# Patient Record
Sex: FEMALE | Race: WHITE | NOT HISPANIC OR LATINO | Employment: OTHER | URBAN - METROPOLITAN AREA
[De-identification: names, ages, dates, MRNs, and addresses within clinical notes are randomized per-mention and may not be internally consistent; named-entity substitution may affect disease eponyms.]

---

## 2024-02-14 ENCOUNTER — APPOINTMENT (OUTPATIENT)
Dept: OBSTETRICS AND GYNECOLOGY | Facility: CLINIC | Age: 74
End: 2024-02-14
Payer: MEDICARE

## 2024-03-01 ENCOUNTER — HOSPITAL ENCOUNTER (OUTPATIENT)
Dept: RADIOLOGY | Facility: HOSPITAL | Age: 74
Discharge: HOME | End: 2024-03-01
Payer: MEDICARE

## 2024-03-01 DIAGNOSIS — Z12.31 ENCOUNTER FOR SCREENING MAMMOGRAM FOR MALIGNANT NEOPLASM OF BREAST: ICD-10-CM

## 2024-03-01 PROCEDURE — 77067 SCR MAMMO BI INCL CAD: CPT

## 2024-03-01 PROCEDURE — 77063 BREAST TOMOSYNTHESIS BI: CPT

## 2024-03-05 ENCOUNTER — APPOINTMENT (OUTPATIENT)
Dept: OBSTETRICS AND GYNECOLOGY | Facility: CLINIC | Age: 74
End: 2024-03-05
Payer: MEDICARE

## 2024-03-18 PROBLEM — N95.1 MENOPAUSAL SYMPTOMS: Status: ACTIVE | Noted: 2024-03-18

## 2024-03-19 ENCOUNTER — OFFICE VISIT (OUTPATIENT)
Dept: OBSTETRICS AND GYNECOLOGY | Facility: CLINIC | Age: 74
End: 2024-03-19
Payer: MEDICARE

## 2024-03-19 VITALS
BODY MASS INDEX: 27.49 KG/M2 | DIASTOLIC BLOOD PRESSURE: 76 MMHG | HEIGHT: 65 IN | WEIGHT: 165 LBS | SYSTOLIC BLOOD PRESSURE: 126 MMHG

## 2024-03-19 DIAGNOSIS — N95.1 MENOPAUSAL SYMPTOMS: Primary | ICD-10-CM

## 2024-03-19 PROCEDURE — 1036F TOBACCO NON-USER: CPT | Performed by: OBSTETRICS & GYNECOLOGY

## 2024-03-19 PROCEDURE — 99214 OFFICE O/P EST MOD 30 MIN: CPT | Performed by: OBSTETRICS & GYNECOLOGY

## 2024-03-19 PROCEDURE — 1160F RVW MEDS BY RX/DR IN RCRD: CPT | Performed by: OBSTETRICS & GYNECOLOGY

## 2024-03-19 PROCEDURE — 1159F MED LIST DOCD IN RCRD: CPT | Performed by: OBSTETRICS & GYNECOLOGY

## 2024-03-19 RX ORDER — FLUTICASONE PROPIONATE 50 MCG
SPRAY, SUSPENSION (ML) NASAL
COMMUNITY
Start: 2022-12-12

## 2024-03-19 RX ORDER — DULOXETIN HYDROCHLORIDE 60 MG/1
60 CAPSULE, DELAYED RELEASE ORAL 2 TIMES DAILY
COMMUNITY
Start: 2019-02-19

## 2024-03-19 RX ORDER — CLONAZEPAM 0.5 MG/1
0.5 TABLET ORAL DAILY PRN
COMMUNITY
Start: 2018-12-20

## 2024-03-19 RX ORDER — LEVOTHYROXINE SODIUM 100 UG/1
TABLET ORAL
COMMUNITY
Start: 2024-01-22

## 2024-03-19 RX ORDER — MEDROXYPROGESTERONE ACETATE 5 MG/1
TABLET ORAL
COMMUNITY
Start: 2015-10-23

## 2024-03-19 RX ORDER — AMITRIPTYLINE HYDROCHLORIDE 25 MG/1
25 TABLET, FILM COATED ORAL
COMMUNITY
Start: 2024-03-14 | End: 2024-06-12

## 2024-03-19 RX ORDER — ESTRADIOL 1 MG/1
1 TABLET ORAL DAILY
Qty: 90 TABLET | Refills: 3 | Status: SHIPPED | OUTPATIENT
Start: 2024-03-19 | End: 2025-03-19

## 2024-03-19 RX ORDER — ROSUVASTATIN CALCIUM 20 MG/1
TABLET, COATED ORAL
COMMUNITY
Start: 2023-09-01

## 2024-03-19 RX ORDER — MEDROXYPROGESTERONE ACETATE 5 MG/1
5 TABLET ORAL DAILY
Qty: 90 TABLET | Refills: 3 | Status: SHIPPED | OUTPATIENT
Start: 2024-03-19 | End: 2025-03-19

## 2024-03-19 RX ORDER — ESTRADIOL 1 MG/1
1 TABLET ORAL DAILY
COMMUNITY
Start: 2020-10-06

## 2024-03-19 RX ORDER — PRAZOSIN HYDROCHLORIDE 2 MG/1
2 CAPSULE ORAL
COMMUNITY
Start: 2024-03-14

## 2024-03-19 RX ORDER — ZINC GLUCONATE 50 MG
500 TABLET ORAL
COMMUNITY

## 2024-03-19 NOTE — ASSESSMENT & PLAN NOTE
ULTRASOUND (11/5/2020): Normal uterus and ovaries, endometrial thickness of 2.93 mm  ULTRASOUND (11/18/2021): Uterus 7.1 x 3.9 x 2.8 cm, endometrial thickness 5.1 mm.  Normal ovaries  ULTRASOUND (1/18/2023): Uterus 7.7 x 3.4 x 3.9 cm, endometrial thickness 4 mm, ovaries not visualized    -- MENOPAUSAL SYMPTOMS: Patient continues with hot flashes and night sweats, continues to be moderately well-controlled with HRT.  Patient understands the risk and desires to continue.  Patient did try to decrease the dose of estrogen however symptoms became worse.  Patient does continue to have occasional spotting.  Her spotting has decreased over time.  Her last ultrasound in January 2023 revealed 4 mm endometrial thickness.  Patient has had a prior unsuccessful attempt at a D&C or endometrial biopsy due to her flush her cervix with the vagina.  Again discussed with the patient option of referral to GYN oncology, repeat ultrasound, and risk of HRT.  Patient desires to continue with the HRT, declines ultrasound this year and will consider next year.  --Status post colonoscopy in 2018  --Normal DEXA scan in 2020  --Normal Pap and HPV in 2020 and 2023.  Prior history of cervical cryotherapy many years ago.  --Normal mammogram March 2024    PLAN:  Declines GYN ultrasound  Estrace 1 mg daily  Provera 5 mg daily  Otherwise follow-up in 1 year

## 2024-03-19 NOTE — PROGRESS NOTES
Subjective   Patient ID: Soheila Gayle is a 74 y.o. female who presents for Annual Exam (Denies problems, needs refills with prior authorizations.).  HPI  74-year-old here for her menopausal symptoms.  Patient continues with HRT with partial control, does have mild hot flashes and night sweats.  Did try decreasing dosage from Estrace 1 mg to Estrace 0.5 mg however symptoms became worse.  Patient continues to have occasional spotting, however has decreased.  Patient denies cardiovascular disease, or VTE.  Patient with no other concerns.      Objective   Physical Exam  Exam conducted with a chaperone present.   Constitutional:       Appearance: Normal appearance.   Cardiovascular:      Rate and Rhythm: Normal rate and regular rhythm.   Pulmonary:      Effort: Pulmonary effort is normal.      Breath sounds: Normal breath sounds.   Chest:   Breasts:     Right: Normal. No mass or tenderness.      Left: Normal. No mass or tenderness.   Abdominal:      Palpations: Abdomen is soft. There is no mass.      Tenderness: There is no abdominal tenderness.   Genitourinary:     General: Normal vulva.      Vagina: Normal. No lesions.      Cervix: No lesion.      Uterus: Normal. Not enlarged and not tender.       Adnexa: Right adnexa normal and left adnexa normal.        Right: No mass or tenderness.          Left: No mass or tenderness.        Rectum: Normal.      Comments: Vagina atrophic, cervix erythematous and flush with the vagina, os not well-visualized.  Musculoskeletal:      Cervical back: Neck supple.   Skin:     General: Skin is warm and dry.   Neurological:      Mental Status: She is alert and oriented to person, place, and time.   Psychiatric:         Mood and Affect: Mood normal.         Behavior: Behavior normal.         Assessment/Plan   Problem List Items Addressed This Visit             ICD-10-CM    Menopausal symptoms - Primary N95.1     ULTRASOUND (11/5/2020): Normal uterus and ovaries, endometrial thickness of  2.93 mm  ULTRASOUND (11/18/2021): Uterus 7.1 x 3.9 x 2.8 cm, endometrial thickness 5.1 mm.  Normal ovaries  ULTRASOUND (1/18/2023): Uterus 7.7 x 3.4 x 3.9 cm, endometrial thickness 4 mm, ovaries not visualized    -- MENOPAUSAL SYMPTOMS: Patient continues with hot flashes and night sweats, continues to be moderately well-controlled with HRT.  Patient understands the risk and desires to continue.  Patient did try to decrease the dose of estrogen however symptoms became worse.  Patient does continue to have occasional spotting.  Her spotting has decreased over time.  Her last ultrasound in January 2023 revealed 4 mm endometrial thickness.  Patient has had a prior unsuccessful attempt at a D&C or endometrial biopsy due to her flush her cervix with the vagina.  Again discussed with the patient option of referral to GYN oncology, repeat ultrasound, and risk of HRT.  Patient desires to continue with the HRT, declines ultrasound this year and will consider next year.  --Status post colonoscopy in 2018  --Normal DEXA scan in 2020  --Normal Pap and HPV in 2020 and 2023.  Prior history of cervical cryotherapy many years ago.  --Normal mammogram March 2024    PLAN:  Declines GYN ultrasound  Estrace 1 mg daily  Provera 5 mg daily  Otherwise follow-up in 1 year

## 2025-03-17 ENCOUNTER — HOSPITAL ENCOUNTER (OUTPATIENT)
Dept: RADIOLOGY | Facility: HOSPITAL | Age: 75
Discharge: HOME | End: 2025-03-17
Payer: MEDICARE

## 2025-03-17 VITALS — BODY MASS INDEX: 27.49 KG/M2 | WEIGHT: 165 LBS | HEIGHT: 65 IN

## 2025-03-17 DIAGNOSIS — Z12.31 VISIT FOR SCREENING MAMMOGRAM: ICD-10-CM

## 2025-03-17 PROCEDURE — 77067 SCR MAMMO BI INCL CAD: CPT | Performed by: RADIOLOGY

## 2025-03-17 PROCEDURE — 77063 BREAST TOMOSYNTHESIS BI: CPT | Performed by: RADIOLOGY

## 2025-03-17 PROCEDURE — 77063 BREAST TOMOSYNTHESIS BI: CPT

## 2025-03-24 ENCOUNTER — APPOINTMENT (OUTPATIENT)
Dept: OBSTETRICS AND GYNECOLOGY | Facility: CLINIC | Age: 75
End: 2025-03-24
Payer: MEDICARE

## 2025-03-24 VITALS — BODY MASS INDEX: 29.79 KG/M2 | WEIGHT: 179 LBS | DIASTOLIC BLOOD PRESSURE: 72 MMHG | SYSTOLIC BLOOD PRESSURE: 120 MMHG

## 2025-03-24 DIAGNOSIS — N95.1 HOT FLASH, MENOPAUSAL: ICD-10-CM

## 2025-03-24 DIAGNOSIS — Z12.4 CERVICAL CANCER SCREENING: ICD-10-CM

## 2025-03-24 DIAGNOSIS — Z01.419 ENCOUNTER FOR ANNUAL ROUTINE GYNECOLOGICAL EXAMINATION: ICD-10-CM

## 2025-03-24 DIAGNOSIS — N95.0 PMB (POSTMENOPAUSAL BLEEDING): Primary | ICD-10-CM

## 2025-03-24 PROCEDURE — 1160F RVW MEDS BY RX/DR IN RCRD: CPT | Performed by: OBSTETRICS & GYNECOLOGY

## 2025-03-24 PROCEDURE — 1159F MED LIST DOCD IN RCRD: CPT | Performed by: OBSTETRICS & GYNECOLOGY

## 2025-03-24 PROCEDURE — 1036F TOBACCO NON-USER: CPT | Performed by: OBSTETRICS & GYNECOLOGY

## 2025-03-24 PROCEDURE — G0101 CA SCREEN;PELVIC/BREAST EXAM: HCPCS | Performed by: OBSTETRICS & GYNECOLOGY

## 2025-03-24 RX ORDER — MEDROXYPROGESTERONE ACETATE 2.5 MG/1
2.5 TABLET ORAL DAILY
Qty: 90 TABLET | Refills: 3 | Status: SHIPPED | OUTPATIENT
Start: 2025-03-24 | End: 2026-03-24

## 2025-03-24 RX ORDER — ESTRADIOL 1 MG/1
1 TABLET ORAL DAILY
Qty: 90 TABLET | Refills: 3 | Status: SHIPPED | OUTPATIENT
Start: 2025-03-24

## 2025-03-24 NOTE — PROGRESS NOTES
Subjective   Patient ID: Soheila Gayle is a 75 y.o. female who presents for Gynecologic Exam (C/O Bleeding after intercourse).  HPI 75 years old G1, P1 and prior patient of Dr. Del Rio presents for yearly exam.  She is on HRT and says that she has had vaginal bleeding on and off especially after sex for which Dr. Del Rio has been keeping an eye on it.  She reports a history of cryosurgery of her cervix in the past.  She recently had a mammogram which was negative.  She says that she is up-to-date with colon cancer screening.  She wants a refill of her HRT.    Review of Systems   Genitourinary:  Positive for vaginal bleeding.   All other systems reviewed and are negative.      Objective   Physical Exam  Vitals reviewed.   Constitutional:       Appearance: Normal appearance.   HENT:      Head: Normocephalic and atraumatic.      Nose: Nose normal.   Cardiovascular:      Rate and Rhythm: Normal rate and regular rhythm.   Pulmonary:      Effort: Pulmonary effort is normal.      Breath sounds: Normal breath sounds.   Chest:      Chest wall: No mass.   Breasts:     Right: Normal.      Left: Normal.   Abdominal:      General: Abdomen is flat. Bowel sounds are normal. There is no distension.      Palpations: Abdomen is soft. There is no mass.   Genitourinary:     General: Normal vulva.      Vagina: Normal.      Cervix: Friability present.      Uterus: Normal.       Adnexa: Right adnexa normal and left adnexa normal.      Rectum: Normal.      Comments: Very small if any cervix was visualized.  It appears is scarring and adhesion at the top of the vagina with bleeding noticed.  Pap smear was done.  Bimanual shows small uterus no adnexal masses.  Musculoskeletal:         General: Normal range of motion.      Cervical back: Normal range of motion.   Skin:     General: Skin is warm and dry.   Neurological:      General: No focal deficit present.      Mental Status: She is alert.   Psychiatric:         Mood and Affect:  Mood normal.         Behavior: Behavior normal.         Assessment/Plan   Problem List Items Addressed This Visit    None  Visit Diagnoses         Codes    PMB (postmenopausal bleeding)    -  Primary N95.0    Relevant Medications    medroxyPROGESTERone (Provera) 2.5 mg tablet    Other Relevant Orders    US PELVIS TRANSABDOMINAL WITH TRANSVAGINAL    Cervical cancer screening     Z12.4    Relevant Orders    THINPREP PAP    Hot flash, menopausal     N95.1    Relevant Medications    estradiol (Estrace) 1 mg tablet    Encounter for annual routine gynecological examination     Z01.419        I reviewed her record including pelvic ultrasound in 2023 which showed endometrial lining 4 mm.  I discussed with her that on hormone replacement therapy the endometrial lining criteria does not hold and is not very reliable.  I have recommended that we cut back on her progesterone to 2.5 mg daily to see if that will help to stabilize the lining.  I sent a refill for estradiol 1 mg daily and medroxyprogesterone 2.5 mg daily.  A pelvic ultrasound was ordered for follow-up.  Follow-up as needed or in 1 to 2 years.         Lily Seymour MD 03/24/25 11:53 AM

## 2025-03-28 ENCOUNTER — TELEPHONE (OUTPATIENT)
Dept: OBSTETRICS AND GYNECOLOGY | Facility: CLINIC | Age: 75
End: 2025-03-28
Payer: MEDICARE

## 2025-03-28 NOTE — TELEPHONE ENCOUNTER
"Patient received a [facility] message stating there is an order placed for pelvic ultrasound, she states she doesn't remember discussing this to be done going forward, she states they discussed the ultrasound she had in the past ordered by Dr. Del Rio and states \"it wasn't really successful, there wasn't much that was seen\". She is asking why does Dr. Seymour want her to have an ultrasound?    "

## 2025-03-28 NOTE — TELEPHONE ENCOUNTER
Spoke with pt regarding pelvic ultrasound order placed following her appt on Monday, 3/24 with Dr Seymour. Reviewed Dr Seymour's note from that visit with pt and why she is recommending a pelvic ultrasound. Reviewed with pt the order is in and she can call to schedule it at any point in time if she chooses to do so. Pt unsure whether to proceed with pelvic ultrasound or not, but glad to clarify why there was an ultrasound order in for her after our phone call.

## 2025-04-02 ENCOUNTER — APPOINTMENT (OUTPATIENT)
Dept: RADIOLOGY | Facility: HOSPITAL | Age: 75
End: 2025-04-02
Payer: MEDICARE

## 2025-04-08 LAB
CYTOLOGY CMNT CVX/VAG CYTO-IMP: NORMAL
LAB AP HPV GENOTYPE QUESTION: YES
LAB AP HPV HR: NORMAL
LABORATORY COMMENT REPORT: NORMAL
PATH REPORT.TOTAL CANCER: NORMAL

## 2025-08-14 ENCOUNTER — HOSPITAL ENCOUNTER (OUTPATIENT)
Dept: RADIOLOGY | Facility: CLINIC | Age: 75
Discharge: HOME | End: 2025-08-14
Payer: MEDICARE

## 2025-08-14 DIAGNOSIS — Z87.39 PERSONAL HISTORY OF OTHER DISEASES OF THE MUSCULOSKELETAL SYSTEM AND CONNECTIVE TISSUE: ICD-10-CM

## 2025-08-14 PROCEDURE — 77080 DXA BONE DENSITY AXIAL: CPT

## 2025-08-14 PROCEDURE — 77080 DXA BONE DENSITY AXIAL: CPT | Performed by: RADIOLOGY
